# Patient Record
Sex: FEMALE | Race: WHITE | NOT HISPANIC OR LATINO | URBAN - METROPOLITAN AREA
[De-identification: names, ages, dates, MRNs, and addresses within clinical notes are randomized per-mention and may not be internally consistent; named-entity substitution may affect disease eponyms.]

---

## 2019-10-08 ENCOUNTER — NURSE TRIAGE (OUTPATIENT)
Dept: PHYSICAL THERAPY | Facility: OTHER | Age: 34
End: 2019-10-08

## 2019-10-08 DIAGNOSIS — M54.6 ACUTE RIGHT-SIDED THORACIC BACK PAIN: Primary | ICD-10-CM

## 2019-10-08 NOTE — TELEPHONE ENCOUNTER
Background - Initial Assessment  Clinical complaint: Right upper back into Right shoulder and spine area  Date of onset: Two weeks ago patient at gym working out and feels she "overworked" herself  Been painful since  Frequency of pain: constant, but worsens with movement  Quality of pain: sharp, shooting and stabbing    Protocols used: [facility] COMPREHENSIVE SPINE PROGRAM PROTOCOL    Patient is new to system and no prior encounters to review-self referral to  [facility]  Pain and discomfort in mid back up to shoulder has bothered her post "work-out" at gym 2 weeks ago  Denies any trauma or injury to area  Nurse discussed the thorough evaluation process and possible physical therapy sessions at a preferred site  Patient agreed and very appreciative of call  All demographics emailed to  and patient aware she will receive a call back to make appointment